# Patient Record
Sex: FEMALE | Race: WHITE | NOT HISPANIC OR LATINO | Employment: PART TIME | URBAN - METROPOLITAN AREA
[De-identification: names, ages, dates, MRNs, and addresses within clinical notes are randomized per-mention and may not be internally consistent; named-entity substitution may affect disease eponyms.]

---

## 2023-09-29 PROBLEM — N39.41 URGE URINARY INCONTINENCE: Status: ACTIVE | Noted: 2023-09-29

## 2023-09-29 PROBLEM — R79.89 ABNORMAL COMPLETE BLOOD COUNT: Status: ACTIVE | Noted: 2023-09-29

## 2023-09-29 PROBLEM — K63.5 COLON POLYP: Status: ACTIVE | Noted: 2023-09-29

## 2023-09-29 PROBLEM — B34.9 NONSPECIFIC SYNDROME SUGGESTIVE OF VIRAL ILLNESS: Status: ACTIVE | Noted: 2023-09-29

## 2023-09-29 RX ORDER — VALACYCLOVIR HYDROCHLORIDE 500 MG/1
500 TABLET, FILM COATED ORAL EVERY 12 HOURS
COMMUNITY
Start: 2023-06-09

## 2023-09-29 RX ORDER — PROGESTERONE 200 MG/1
200 CAPSULE ORAL NIGHTLY
COMMUNITY

## 2023-09-29 ASSESSMENT — PROMIS GLOBAL HEALTH SCALE
RATE_SOCIAL_SATISFACTION: EXCELLENT
RATE_AVERAGE_PAIN: 3
RATE_AVERAGE_FATIGUE: MILD
RATE_PHYSICAL_HEALTH: VERY GOOD
CARRYOUT_PHYSICAL_ACTIVITIES: COMPLETELY
EMOTIONAL_PROBLEMS: NEVER
RATE_MENTAL_HEALTH: VERY GOOD
RATE_GENERAL_HEALTH: VERY GOOD
RATE_QUALITY_OF_LIFE: EXCELLENT
CARRYOUT_SOCIAL_ACTIVITIES: EXCELLENT

## 2023-10-03 ENCOUNTER — OFFICE VISIT (OUTPATIENT)
Dept: PRIMARY CARE | Facility: CLINIC | Age: 58
End: 2023-10-03
Payer: COMMERCIAL

## 2023-10-03 VITALS
SYSTOLIC BLOOD PRESSURE: 108 MMHG | OXYGEN SATURATION: 98 % | HEART RATE: 74 BPM | TEMPERATURE: 97.2 F | WEIGHT: 112.1 LBS | DIASTOLIC BLOOD PRESSURE: 84 MMHG | BODY MASS INDEX: 19.24 KG/M2

## 2023-10-03 DIAGNOSIS — Z00.00 ANNUAL PHYSICAL EXAM: Primary | ICD-10-CM

## 2023-10-03 DIAGNOSIS — R79.89 ABNORMAL THYROID BLOOD TEST: ICD-10-CM

## 2023-10-03 DIAGNOSIS — Z23 ENCOUNTER FOR IMMUNIZATION: ICD-10-CM

## 2023-10-03 DIAGNOSIS — Z12.31 ENCOUNTER FOR SCREENING MAMMOGRAM FOR BREAST CANCER: ICD-10-CM

## 2023-10-03 DIAGNOSIS — Z78.9 VEGAN DIET: ICD-10-CM

## 2023-10-03 PROCEDURE — 90471 IMMUNIZATION ADMIN: CPT | Performed by: INTERNAL MEDICINE

## 2023-10-03 PROCEDURE — 1036F TOBACCO NON-USER: CPT | Performed by: INTERNAL MEDICINE

## 2023-10-03 PROCEDURE — 99396 PREV VISIT EST AGE 40-64: CPT | Performed by: INTERNAL MEDICINE

## 2023-10-03 PROCEDURE — 90686 IIV4 VACC NO PRSV 0.5 ML IM: CPT | Performed by: INTERNAL MEDICINE

## 2023-10-03 ASSESSMENT — PATIENT HEALTH QUESTIONNAIRE - PHQ9
2. FEELING DOWN, DEPRESSED OR HOPELESS: NOT AT ALL
1. LITTLE INTEREST OR PLEASURE IN DOING THINGS: NOT AT ALL
SUM OF ALL RESPONSES TO PHQ9 QUESTIONS 1 AND 2: 0

## 2023-10-03 NOTE — PROGRESS NOTES
Subjective   Patient ID: Linda Webber is a 57 y.o. female who presents for Annual Exam.  HPI    Patient is here for annual exam  Does not have any new concerns today  Last exam was 1 year ago sees ophthalmologist regularly.    Consumes healthy diet    does regular exercise  pregnancy [id number]  No bladder symptoms  Cervical cancer screen current normal   No history of abnormal Pap  Mammogram due   Colonoscopy       Review of Systems  General: No significant change in weight, no fatigue, no fever, chills, or night sweats.  HEENT: No headache, dizziness, syncope. No blurred vision, double vision. No hearing loss, or ringing. No nasal discharge, sinus problems. No loose teeth, sore throat, hoarseness or neck stiffness.   Breast: No pain or discharge. No mass felt.   Respiratory: No cough, mucous in throat, hemoptysis, wheezing, or shortness of breath. No snoring.  Cardiac: No chest pain, palpitations, orthopnea. No leg swelling or claudication pain.   Gastrointestinal: No indigestion, heartburn, nausea, vomiting, diarrhea, constipation, rectal bleeding or hemorrhoids.  Genitourinary: No UTI symptoms, stones, incontinence.  OBGYN: No abnormal bleeding, No pelvic pain or bloating.  Endocrine: No excess thirst or urination.  Hematopoietic: No anemia, easy bruising or bleeding. No history of blood transfusion.  Neuro: No localized weakness, numbness or tingling. No tremor, history of seizure. No history of memory problems.  Psychological: No anxiety, depression or sleep disturbance.    HISTORY  Social History     Socioeconomic History    Marital status:      Spouse name: Not on file    Number of children: Not on file    Years of education: Not on file    Highest education level: Not on file   Occupational History    Not on file   Tobacco Use    Smoking status: Never    Smokeless tobacco: Never   Substance and Sexual Activity    Alcohol use: Yes    Drug use: Never    Sexual activity: Not on file    Other Topics Concern    Not on file   Social History Narrative    Not on file     Social Determinants of Health     Financial Resource Strain: Not on file   Food Insecurity: Not on file   Transportation Needs: Not on file   Physical Activity: Not on file   Stress: Not on file   Social Connections: Not on file   Intimate Partner Violence: Not on file   Housing Stability: Not on file     Family History   Problem Relation Name Age of Onset    Other (CVA) Mother      Colon polyps Father      Coronary artery disease Father      Cancer Father          prostate    Breast cancer Father's Sister       History reviewed. No pertinent past medical history.  Past Surgical History:   Procedure Laterality Date    OTHER SURGICAL HISTORY  11/17/2020    [place] tooth extraction       Objective   /84   Pulse 74   Temp 36.2 °C (97.2 °F)   Wt 50.8 kg (112 lb 1.6 oz)   SpO2 98%   BMI 19.24 kg/m²      Lab Results   Component Value Date    WBC 3.2 (L) 11/17/2020    HGB 13.4 11/17/2020    HCT 41.5 11/17/2020     (H) 11/17/2020     11/17/2020      Physical Exam  In general, well-appearing, not in acute distress, alert and oriented.  HEENT: Pupils PERRLA.  No pallor or icterus  Neck: No lymphadenopathy, no stiffness.  Supple.  No enlargement of thyroid.No JVD  Chest: Clear to auscultation, good air entry.  CVS: S1 and S2 regular.  No murmur, no gallop, S3 or S4.  No peripheral edema.  No carotid bruit.  Abdomen: Soft, no tenderness, no hepatosplenomegaly.  Extremities: No calf tenderness.  No peripheral edema.  No knee or ankle effusion.  No finger synovitis.  No clubbing or cyanosis.  Neuro: No focal deficits.  Psych: Mood and affect normal.  Good judgment and insight  Skin:No rash    Assessment/Plan   Problem List Items Addressed This Visit       Annual physical exam - Primary     Patient examined counseling completed.  Routine labs ordered.  Additional labs since she has history of abnormal thyroid test and  follows a vegan diet  She sees gynecologist regularly  Up-to-date with colonoscopy and gets testing every 5 years due to family history  Hip pain is better after injection  Screening mammogram ordered.  Flu shot given  Get Shingrix vaccine and Tdap         Relevant Orders    CBC and Auto Differential    Comprehensive Metabolic Panel    Lipid Panel    TSH with reflex to Free T4 if abnormal    Thyroid Peroxidase (TPO) Antibody    Vitamin B12    Vitamin D 25-Hydroxy,Total (for eval of Vitamin D levels)    Vegan diet     Other Visit Diagnoses       Encounter for screening mammogram for breast cancer        Relevant Orders    Vitamin B12    Vitamin D 25-Hydroxy,Total (for eval of Vitamin D levels)    BI mammo bilateral screening tomosynthesis    Abnormal thyroid blood test        Relevant Orders    TSH with reflex to Free T4 if abnormal    Thyroid Peroxidase (TPO) Antibody    Encounter for immunization        Relevant Orders    Flu vaccine (IIV4) age 6 months and greater, preservative free (Completed)

## 2023-10-04 NOTE — ASSESSMENT & PLAN NOTE
Patient examined counseling completed.  Routine labs ordered.  Additional labs since she has history of abnormal thyroid test and follows a vegan diet  She sees gynecologist regularly  Up-to-date with colonoscopy and gets testing every 5 years due to family history  Hip pain is better after injection  Screening mammogram ordered.  Flu shot given  Get Shingrix vaccine and Tdap

## 2023-10-06 LAB
NON-UH HIE A/G RATIO: 2
NON-UH HIE ALB: 4.7 G/DL (ref 3.4–5)
NON-UH HIE ALK PHOS: 57 UNIT/L (ref 46–116)
NON-UH HIE BASO COUNT: 0.03 X1000 (ref 0–0.2)
NON-UH HIE BASOS %: 0.7 %
NON-UH HIE BILIRUBIN, TOTAL: 0.8 MG/DL (ref 0.2–1)
NON-UH HIE BUN/CREAT RATIO: 10
NON-UH HIE BUN: 9 MG/DL (ref 9–23)
NON-UH HIE CALCIUM: 9.6 MG/DL (ref 8.7–10.4)
NON-UH HIE CALCULATED LDL CHOLESTEROL: 177 MG/DL (ref 60–130)
NON-UH HIE CALCULATED OSMOLALITY: 271 MOSM/KG (ref 275–295)
NON-UH HIE CHLORIDE: 104 MMOL/L (ref 98–107)
NON-UH HIE CHOLESTEROL: 256 MG/DL (ref 100–200)
NON-UH HIE CO2, VENOUS: 27 MMOL/L (ref 20–31)
NON-UH HIE CREATININE: 0.9 MG/DL (ref 0.5–0.8)
NON-UH HIE DIFF?: NO
NON-UH HIE EOS COUNT: 0 X1000 (ref 0–0.5)
NON-UH HIE EOSIN %: 0 %
NON-UH HIE GFR AA: >60
NON-UH HIE GLOBULIN: 2.4 G/DL
NON-UH HIE GLOMERULAR FILTRATION RATE: >60 ML/MIN/1.73M?
NON-UH HIE GLUCOSE: 79 MG/DL (ref 74–106)
NON-UH HIE GOT: 19 UNIT/L (ref 15–37)
NON-UH HIE GPT: 11 UNIT/L (ref 10–49)
NON-UH HIE HCT: 42.6 % (ref 36–46)
NON-UH HIE HDL CHOLESTEROL: 50 MG/DL (ref 40–60)
NON-UH HIE HGB: 14.2 G/DL (ref 12–16)
NON-UH HIE INSTR WBC: 4
NON-UH HIE K: 3.8 MMOL/L (ref 3.5–5.1)
NON-UH HIE LYMPH %: 28 %
NON-UH HIE LYMPH COUNT: 1.11 X1000 (ref 1.2–4.8)
NON-UH HIE MCH: 33 PG (ref 27–34)
NON-UH HIE MCHC: 33.3 G/DL (ref 32–37)
NON-UH HIE MCV: 99 FL (ref 80–100)
NON-UH HIE MONO %: 8 %
NON-UH HIE MONO COUNT: 0.32 X1000 (ref 0.1–1)
NON-UH HIE MPV: 9.5 FL (ref 7.4–10.4)
NON-UH HIE NA: 137 MMOL/L (ref 135–145)
NON-UH HIE NEUTROPHIL %: 63.4 %
NON-UH HIE NEUTROPHIL COUNT (ANC): 2.51 X1000 (ref 1.4–8.8)
NON-UH HIE NUCLEATED RBC: 0 /100WBC
NON-UH HIE PLATELET: 238 X10 (ref 150–450)
NON-UH HIE RBC: 4.31 X10 (ref 4.2–5.4)
NON-UH HIE RDW: 13.1 % (ref 11.5–14.5)
NON-UH HIE TOTAL CHOL/HDL CHOL RATIO: 5.1
NON-UH HIE TOTAL PROTEIN: 7.1 G/DL (ref 5.7–8.2)
NON-UH HIE TRIGLYCERIDES: 145 MG/DL (ref 30–150)
NON-UH HIE TSH: 1.87 UIU/ML (ref 0.55–4.78)
NON-UH HIE VIT D 25: 44 NG/ML
NON-UH HIE VITAMIN B12: 1892 PG/ML (ref 211–911)
NON-UH HIE WBC: 4 X10 (ref 4.5–11)

## 2023-10-09 ENCOUNTER — TELEPHONE (OUTPATIENT)
Dept: PRIMARY CARE | Facility: CLINIC | Age: 58
End: 2023-10-09
Payer: COMMERCIAL

## 2023-10-09 LAB — NON-UH HIE THYROID (TPO) AB: 197.5 IU/ML (ref 0–9)

## 2023-10-09 NOTE — TELEPHONE ENCOUNTER
Result Communication  discussed with  patient.  B12 is high.  Decrease dose to 1000 mcg every other day    LDL high.  She will work on diet changes and will repeat labs in 3 to 4 months.  Thyroid antibodies coming down  Thyroid test is normal  Increase salt intake.  Other labs are good    Resulted Orders   [facility] HEMO   Result Value Ref Range    [facility] HCT 42.6 36.0 - 46.0 %    [facility] RBC 4.31 4.20 - 5.40 x10      Comment:      Note: RBC morphology is normal unless otherwise stated.  Evaluation performed only if differential is requested.    [facility] Platelet 238 150 - 450 x10    [facility] Nucleated RBC 0 /100WBC    [facility] Instr WBC 4.0     [facility] MCHC 33.3 32.0 - 37.0 g/dL    [facility] MCV 99.0 80.0 - 100.0 fL    [facility] HGB 14.2 12.0 - 16.0 g/dL    [facility] WBC 4.0 (L) 4.5 - 11.0 x10    [facility] RDW 13.1 11.5 - 14.5 %    [facility] MPV 9.5 7.4 - 10.4 fL    [facility] MCH 33.0 27.0 - 34.0 pg    [facility] DIFF? No    [facility] AUTO DIFF   Result Value Ref Range    [facility] Lymph Count 1.11 (L) 1.20 - 4.80 x1000    [facility] Basos % 0.7 %    [facility] Baso Count 0.03 0.00 - 0.20 x1000    [facility] Mono % 8.0 %    [facility] Mono Count 0.32 0.10 - 1.00 x1000    [facility] Neutrophil Count (ANC) 2.51 1.40 - 8.80 x1000    [facility] Neutrophil % 63.4 %    [facility] Eos Count 0.00 0.00 - 0.50 x1000    [facility] Eosin % 0.0 %    [facility] Lymph % 28.0 %   [facility] COMPMETA   Result Value Ref Range    [facility] BUN 9 9 - 23 mg/dL      Comment:      -  Venipuncture should occur prior to N-Acetyl Cysteine (NAC) or Metamizole (Sulpyrine) administration due to the potential for falsely depressed results.  -  Blood samples from some patients with monoclonal gammopathies may produce falsely elevated results      [facility] Total Protein 7.1 5.7 - 8.2 g/dL      Comment:      Total Protein results may be increased in patients receiving dextran as a blood volume expander    [facility] ALB  4.7 3.4 - 5.0 g/dL    [facility] CO2, venous 27.0 20.0 - 31.0 mmol/L    [facility] Glomerular Filtration Rate >60 mL/min/1.73m?      Comment:      Non- GFR CalcMedical judgement is necessary to interpret GFR.  The calculated GFR may not accurately reflect renal status in patients >70 years, pregnant women, acutely ill hospitalized patients and patients with acute renal failure or known renal disease.  The MDRD GFR formula is valid only for adults greater than 18 years of age.  Note:  Creatinine clearance (not GFR) should be used for drug dosing.      [facility] Chloride 104 98 - 107 mmol/L    [facility] Calculated Osmolality 271 (L) 275 - 295 mOsm/kg    [facility] Na 137 135 - 145 mmol/L    [facility] Globulin 2.4 g/dL    [facility] Calcium 9.6 8.7 - 10.4 mg/dL    [facility] GOT 19 15 - 37 unit/L    [facility] Alk Phos 57 46 - 116 unit/L    [facility] Glucose 79 74 - 106 mg/dL    [facility] GFR AA >60       Comment:       GFR CalcMedical judgement is necessary to interpret GFR.  The calculated GFR may not accurately reflect renal status in patients >70 years, pregnant women, acutely ill hospitalized patients and patients with acute renal failure or known renal disease.  The MDRD GFR formula is valid only for adults greater than 18 years of age.  Note:  Creatinine clearance (not GFR) should be used for drug dosing.      [facility] Bilirubin, Total 0.80 0.20 - 1.00 mg/dL      Comment:      Use of this assay is not recommended for patients undergoing treatment with eltrombopag due to the potential for falsely elevated results.    [facility] A/G Ratio 2.0     [facility] K 3.8 3.5 - 5.1 mmol/L    [facility] BUN/Creat Ratio 10.0     [facility] GPT 11 10 - 49 unit/L    [facility] Creatinine 0.9 (H) 0.5 - 0.8 mg/dL   [facility] LIPID PNL   Result Value Ref Range    [facility] Total Chol/HDL Chol Ratio 5.1     [facility] Triglycerides 145 30 - 150 mg/dL      Comment:      -  Venipuncture  should occur prior to N-Acetyl Cysteine (NAC) or Metamizole (Sulpyrine) administration due to the potential for falsely depressed results  -  Use of this assay is not recommended for patients being treated with etamsylate because it causes falsely decreased results      [facility] Cholesterol 256 (H) 100 - 200 mg/dL      Comment:      Venipuncture should occur prior to N-Acetyl Cysteine (NAC) or Metamizole (Sulpyrine) administration due to the potential for falsely depressed results.    [facility] Calculated LDL Cholesterol 177 (H) 60 - 130 mg/dL      Comment:      <100 mg/dl Optimal  100-129 mg/dl Near Optimal  130-159 mg/dl Borderline High  160-189 mg/dl High  >=190 mg/dl Very High      [facility] HDL Cholesterol 50 40 - 60 mg/dL      Comment:      Direct HDL Venipuncture should occur prior to metamizole (sulpyrine) administration due to the potential for falsely depressed results   [facility] VIT D 25 LEVEL   Result Value Ref Range    [facility] Vit D 25 44 ng/mL      Comment:      Less than 20 ng/mL  Deficient  20 ? 30 ng/mL   Insufficient  30 ? 100 ng/mL   Sufficiency  Greater than 100 ng/mL Potential Toxicity     [facility] TSH with FT4 Reflex   Result Value Ref Range    [facility] TSH 1.87 0.55 - 4.78 uIU/ml      Comment:      -  Do not use samples that contain fluorescein. Fluorescein levels > 0.24 ?g/mL may decrease results in this assay  -  Patients undergoing retinal fluorescein angiography can retain amounts of fluorescein in the body for up to 48?72 hours post-treatment. Such samples can produce falsely depressed values when tested with this assay, and should not be tested    Pregnancy Reference Intervals (if applicable):  First trimester:  0.6-3.4 uIU/mL  Second trimester:  0.37-3.6 uIU/mL  Third trimester:  0.38-4.04 uIU/mL  Reference: [email or web address] (10/2023)     [facility] VIT B12 LEVEL   Result Value Ref Range    [facility] Vitamin B12 1,892 (H) 211 - 911 pg/mL       11:19 AM      Results were  successfully communicated with the patient and they acknowledged their understanding.

## 2023-10-09 NOTE — TELEPHONE ENCOUNTER
Result Communication  Thyroid antibody coming down    Resulted Orders   [facility] TPO   Result Value Ref Range    [facility] Thyroid (TPO) Ab 197.5 (H) 0.0 - 9.0 IU/mL      Comment:      Performed By: [facility]  [address] [place] [id number]  : Richard Lopez MD, PhD  CLIA Number: [id number]         11:20 AM      Results were successfully communicated with the patient and they acknowledged their understanding.

## 2024-06-11 DIAGNOSIS — Z13.820 SCREENING FOR OSTEOPOROSIS: Primary | ICD-10-CM

## 2024-06-13 ENCOUNTER — HOSPITAL ENCOUNTER (OUTPATIENT)
Dept: RADIOLOGY | Facility: CLINIC | Age: 59
Discharge: HOME | End: 2024-06-13
Payer: COMMERCIAL

## 2024-06-13 DIAGNOSIS — Z13.820 SCREENING FOR OSTEOPOROSIS: ICD-10-CM

## 2024-06-13 PROCEDURE — 77080 DXA BONE DENSITY AXIAL: CPT

## 2024-06-13 PROCEDURE — 77080 DXA BONE DENSITY AXIAL: CPT | Performed by: RADIOLOGY

## 2024-06-14 NOTE — RESULT ENCOUNTER NOTE
Bone density test shows osteopenia or low bone mass.  No osteoporosis yet.  Since you already had a stress  fracture can consider preventive treatment.  Please make an appointment if you are interested to start medication

## 2024-10-01 ASSESSMENT — PROMIS GLOBAL HEALTH SCALE
RATE_SOCIAL_SATISFACTION: EXCELLENT
EMOTIONAL_PROBLEMS: NEVER
RATE_MENTAL_HEALTH: VERY GOOD
RATE_GENERAL_HEALTH: VERY GOOD
RATE_PHYSICAL_HEALTH: VERY GOOD
CARRYOUT_SOCIAL_ACTIVITIES: EXCELLENT
CARRYOUT_PHYSICAL_ACTIVITIES: COMPLETELY
RATE_AVERAGE_PAIN: 2
RATE_QUALITY_OF_LIFE: EXCELLENT
RATE_AVERAGE_FATIGUE: MODERATE

## 2024-10-02 ENCOUNTER — APPOINTMENT (OUTPATIENT)
Dept: PRIMARY CARE | Facility: CLINIC | Age: 59
End: 2024-10-02
Payer: COMMERCIAL

## 2024-10-02 VITALS
HEIGHT: 64 IN | SYSTOLIC BLOOD PRESSURE: 115 MMHG | OXYGEN SATURATION: 98 % | BODY MASS INDEX: 19.75 KG/M2 | DIASTOLIC BLOOD PRESSURE: 77 MMHG | HEART RATE: 81 BPM | WEIGHT: 115.7 LBS

## 2024-10-02 DIAGNOSIS — Z00.00 ANNUAL PHYSICAL EXAM: Primary | ICD-10-CM

## 2024-10-02 DIAGNOSIS — Z12.31 ENCOUNTER FOR SCREENING MAMMOGRAM FOR BREAST CANCER: ICD-10-CM

## 2024-10-02 PROCEDURE — 90656 IIV3 VACC NO PRSV 0.5 ML IM: CPT | Performed by: INTERNAL MEDICINE

## 2024-10-02 PROCEDURE — 90472 IMMUNIZATION ADMIN EACH ADD: CPT | Performed by: INTERNAL MEDICINE

## 2024-10-02 PROCEDURE — 90471 IMMUNIZATION ADMIN: CPT | Performed by: INTERNAL MEDICINE

## 2024-10-02 PROCEDURE — 99396 PREV VISIT EST AGE 40-64: CPT | Performed by: INTERNAL MEDICINE

## 2024-10-02 PROCEDURE — 90715 TDAP VACCINE 7 YRS/> IM: CPT | Performed by: INTERNAL MEDICINE

## 2024-10-02 PROCEDURE — 3008F BODY MASS INDEX DOCD: CPT | Performed by: INTERNAL MEDICINE

## 2024-10-02 PROCEDURE — 1036F TOBACCO NON-USER: CPT | Performed by: INTERNAL MEDICINE

## 2024-10-02 NOTE — PROGRESS NOTES
"Subjective   Patient ID: Linda Webber \"Marquis" is a 58 y.o. female who presents for Annual Exam (Flu shot.).  HPI    Patient is here for annual exam  Does not have any new concerns today  Sees dentist regularly.    Consumes healthy diet/vegan    Does regular exercise  pregnancy [id number]  No bladder symptoms  Cervical cancer screen current normal   No history of abnormal Pap  Mammogram due   Colonoscopy   Medical conditions:high cholesterol and osteopenia  Vaccines:shingrix due.tdap and flu due    Review of Systems  General: No significant change in weight, no fatigue, no fever, chills, or night sweats.  HEENT: No headache, dizziness, syncope. No blurred vision, double vision. No hearing loss, or ringing. No nasal discharge, sinus problems. No loose teeth, sore throat, hoarseness or neck stiffness.   Breast: No pain or discharge. No mass felt.   Respiratory: No cough, mucous in throat, hemoptysis, wheezing, or shortness of breath. No snoring.  Cardiac: No chest pain, palpitations, orthopnea. No leg swelling or claudication pain.   Gastrointestinal: No indigestion, heartburn, nausea, vomiting, diarrhea, constipation, rectal bleeding or hemorrhoids.  Genitourinary: No UTI symptoms, stones, incontinence.  OBGYN: No abnormal bleeding, No pelvic pain or bloating.  Endocrine: No excess thirst or urination.  Hematopoietic: No anemia, easy bruising or bleeding. No history of blood transfusion.  Neuro: No localized weakness, numbness or tingling. No tremor, history of seizure. No history of memory problems.  Psychological: No anxiety, depression or sleep disturbance.    HISTORY  Social History     Socioeconomic History    Marital status:      Spouse name: Not on file    Number of children: Not on file    Years of education: Not on file    Highest education level: Not on file   Occupational History    Not on file   Tobacco Use    Smoking status: Never    Smokeless tobacco: Never   Substance and Sexual " "Activity    Alcohol use: Never    Drug use: Never    Sexual activity: Not on file   Other Topics Concern    Not on file   Social History Narrative    Not on file     Social Determinants of Health     Financial Resource Strain: Not on file   Food Insecurity: Not on file   Transportation Needs: Not on file   Physical Activity: Not on file   Stress: Not on file   Social Connections: Not on file   Intimate Partner Violence: Not on file   Housing Stability: Not on file     Family History   Problem Relation Name Age of Onset    Other (CVA) Mother      Colon polyps Father      Coronary artery disease Father      Cancer Father          prostate    Breast cancer Father's Sister       History reviewed. No pertinent past medical history.  Past Surgical History:   Procedure Laterality Date    OTHER SURGICAL HISTORY  11/17/2020    [place] tooth extraction     @[facility]  Objective   /77   Pulse 81   Ht 1.626 m (5' 4\")   Wt 52.5 kg (115 lb 11.2 oz)   SpO2 98%   BMI 19.86 kg/m²      Lab Results   Component Value Date    WBC 3.2 (L) 11/17/2020    HGB 13.4 11/17/2020    HCT 41.5 11/17/2020     (H) 11/17/2020     11/17/2020   PAP@MAMMOGRAM  Physical Exam  In general, well-appearing, not in acute distress, alert and oriented.  HEENT: Pupils PERRLA.  No pallor or icterus  Neck: No lymphadenopathy, no stiffness.  Supple.  No enlargement of thyroid.No JVD  Chest: Clear to auscultation, good air entry.  CVS: S1 and S2 regular.  No murmur, no gallop, S3 or S4.  No peripheral edema.  No carotid bruit.  Abdomen: Soft, no tenderness, no hepatosplenomegaly.  Extremities: No calf tenderness.  No peripheral edema.  No knee or ankle effusion.  No finger synovitis.  No clubbing or cyanosis.  Neuro: No focal deficits.  Psych: Mood and affect normal.  Good judgment and insight  Skin:No rash    Assessment/Plan   Problem List Items Addressed This Visit       Annual physical exam - Primary    Relevant Orders    Comprehensive " Metabolic Panel    CBC and Auto Differential    Lipid Panel    Thyroid Peroxidase (TPO) Antibody    TSH with reflex to Free T4 if abnormal     Other Visit Diagnoses       Encounter for screening mammogram for breast cancer        Relevant Orders    BI mammo bilateral screening tomosynthesis            Patient examined counseling completed.  Routine labs ordered.  Additional labs since she has history of abnormal thyroid test and follows a vegan diet.  Sees gynecologist regularly  Up-to-date with colonoscopy and gets testing every 5 years due to family history  Screening mammogram ordered.  Flu shot given  Get Shingrix vaccine   consider CT scoring if LDL is high

## 2024-10-03 DIAGNOSIS — E78.2 MIXED HYPERLIPIDEMIA: Primary | ICD-10-CM

## 2024-10-03 LAB
NON-UH HIE A/G RATIO: 1.4
NON-UH HIE ALB: 4 G/DL (ref 3.4–5)
NON-UH HIE ALK PHOS: 70 UNIT/L (ref 45–117)
NON-UH HIE BASO COUNT: 0.04 X1000 (ref 0–0.2)
NON-UH HIE BASOS %: 0.7 %
NON-UH HIE BILIRUBIN, TOTAL: 0.7 MG/DL (ref 0.3–1.2)
NON-UH HIE BUN/CREAT RATIO: 11.1
NON-UH HIE BUN: 10 MG/DL (ref 9–23)
NON-UH HIE CALCIUM: 9.8 MG/DL (ref 8.7–10.4)
NON-UH HIE CALCULATED LDL CHOLESTEROL: 156 MG/DL (ref 60–130)
NON-UH HIE CALCULATED OSMOLALITY: 276 MOSM/KG (ref 275–295)
NON-UH HIE CHLORIDE: 105 MMOL/L (ref 98–107)
NON-UH HIE CHOLESTEROL: 247 MG/DL (ref 100–200)
NON-UH HIE CO2, VENOUS: 26 MMOL/L (ref 20–31)
NON-UH HIE CREATININE: 0.9 MG/DL (ref 0.5–0.8)
NON-UH HIE DIFF?: NO
NON-UH HIE EOS COUNT: 0 X1000 (ref 0–0.5)
NON-UH HIE EOSIN %: 0 %
NON-UH HIE GFR AA: >60
NON-UH HIE GLOBULIN: 2.8 G/DL
NON-UH HIE GLOMERULAR FILTRATION RATE: >60 ML/MIN/1.73M?
NON-UH HIE GLUCOSE: 90 MG/DL (ref 74–106)
NON-UH HIE GOT: 19 UNIT/L (ref 15–37)
NON-UH HIE GPT: 13 UNIT/L (ref 10–49)
NON-UH HIE HCT: 40.4 % (ref 36–46)
NON-UH HIE HDL CHOLESTEROL: 52 MG/DL (ref 40–60)
NON-UH HIE HGB: 13.4 G/DL (ref 12–16)
NON-UH HIE INSTR WBC: 5.7
NON-UH HIE K: 4.4 MMOL/L (ref 3.5–5.1)
NON-UH HIE LYMPH %: 19.5 %
NON-UH HIE LYMPH COUNT: 1.1 X1000 (ref 1.2–4.8)
NON-UH HIE MCH: 32 PG (ref 27–34)
NON-UH HIE MCHC: 33.1 G/DL (ref 32–37)
NON-UH HIE MCV: 96.5 FL (ref 80–100)
NON-UH HIE MONO %: 6.4 %
NON-UH HIE MONO COUNT: 0.36 X1000 (ref 0.1–1)
NON-UH HIE MPV: 9 FL (ref 7.4–10.4)
NON-UH HIE NA: 139 MMOL/L (ref 135–145)
NON-UH HIE NEUTROPHIL %: 73.4 %
NON-UH HIE NEUTROPHIL COUNT (ANC): 4.15 X1000 (ref 1.4–8.8)
NON-UH HIE NUCLEATED RBC: 0 /100WBC
NON-UH HIE PLATELET: 264 X10 (ref 150–450)
NON-UH HIE RBC: 4.19 X10 (ref 4.2–5.4)
NON-UH HIE RDW: 13.2 % (ref 11.5–14.5)
NON-UH HIE TOTAL CHOL/HDL CHOL RATIO: 4.8
NON-UH HIE TOTAL PROTEIN: 6.8 G/DL (ref 5.7–8.2)
NON-UH HIE TRIGLYCERIDES: 194 MG/DL (ref 30–150)
NON-UH HIE TSH: 2.38 UIU/ML (ref 0.55–4.78)
NON-UH HIE WBC: 5.7 X10 (ref 4.5–11)

## 2024-10-03 NOTE — RESULT ENCOUNTER NOTE
Reviewed labs.  Labs look okay.  LDL and cholesterol slightly lower than last time.  I ordered the CT scoring as discussed at visit

## 2024-10-04 LAB — NON-UH HIE THYROID (TPO) AB: 680.2 IU/ML (ref 0–9)

## 2024-12-20 ENCOUNTER — HOSPITAL ENCOUNTER (OUTPATIENT)
Dept: RADIOLOGY | Facility: CLINIC | Age: 59
Discharge: HOME | End: 2024-12-20
Payer: COMMERCIAL

## 2024-12-20 DIAGNOSIS — E78.2 MIXED HYPERLIPIDEMIA: ICD-10-CM

## 2024-12-20 PROCEDURE — 75571 CT HRT W/O DYE W/CA TEST: CPT

## 2025-06-03 ENCOUNTER — APPOINTMENT (OUTPATIENT)
Dept: PRIMARY CARE | Facility: CLINIC | Age: 60
End: 2025-06-03
Payer: COMMERCIAL

## 2025-06-03 VITALS
WEIGHT: 115.2 LBS | BODY MASS INDEX: 19.67 KG/M2 | SYSTOLIC BLOOD PRESSURE: 124 MMHG | HEART RATE: 77 BPM | OXYGEN SATURATION: 96 % | HEIGHT: 64 IN | DIASTOLIC BLOOD PRESSURE: 81 MMHG

## 2025-06-03 DIAGNOSIS — H00.014 HORDEOLUM EXTERNUM OF LEFT UPPER EYELID: ICD-10-CM

## 2025-06-03 DIAGNOSIS — E06.3 HASHIMOTO'S THYROIDITIS: ICD-10-CM

## 2025-06-03 DIAGNOSIS — R53.81 MALAISE: Primary | ICD-10-CM

## 2025-06-03 DIAGNOSIS — Z78.9 VEGAN DIET: ICD-10-CM

## 2025-06-03 DIAGNOSIS — L65.9 HAIR LOSS: ICD-10-CM

## 2025-06-03 PROCEDURE — 99213 OFFICE O/P EST LOW 20 MIN: CPT | Performed by: INTERNAL MEDICINE

## 2025-06-03 PROCEDURE — 1036F TOBACCO NON-USER: CPT | Performed by: INTERNAL MEDICINE

## 2025-06-03 PROCEDURE — 3008F BODY MASS INDEX DOCD: CPT | Performed by: INTERNAL MEDICINE

## 2025-06-03 RX ORDER — ERYTHROMYCIN 5 MG/G
OINTMENT OPHTHALMIC 4 TIMES DAILY
Qty: 3.5 G | Refills: 0 | Status: SHIPPED | OUTPATIENT
Start: 2025-06-03 | End: 2025-06-10

## 2025-06-03 ASSESSMENT — PATIENT HEALTH QUESTIONNAIRE - PHQ9
1. LITTLE INTEREST OR PLEASURE IN DOING THINGS: NOT AT ALL
SUM OF ALL RESPONSES TO PHQ9 QUESTIONS 1 AND 2: 0
2. FEELING DOWN, DEPRESSED OR HOPELESS: NOT AT ALL

## 2025-06-03 NOTE — PROGRESS NOTES
"Subjective   Patient ID: Denise Webber is a 59 y.o. female who presents for Fatigue (Pt states she has been fatigued lately. She feels a little better since she made the appt. But would like to discuss.).    HPI   On HRT.Has pellets implanted and is on progesterone  Was feeling very tired since April  Was in [place] from Jan to [place] first  Was not sick    Has Hashimoto's.  No weight loss or night sweats  No cough chest pain or recent illness  No GI symptoms  Up-to-date with GYN care and mammogram and colonoscopy  Has a stye left upper lid.  Using warm compresses but not resolving.  No significant pain.  Has contact lens        Review of Systems  Constitutional: No recent weight loss, no fever or chills or night sweats.    HEENT: No blurred vision or double vision.  No hearing loss.  No sinus drainage or nosebleeds  CVS: No exertional chest pain or shortness of breath.  No palpitation or edema  Respiratory: No chronic cough or shortness of breath or wheezing  GI: No nausea vomiting or heartburn diarrhea or constipation.  No rectal bleed or bowel changes  Genitourinary: No urinary frequency or hesitancy.  No nocturia or blood in urine  Neuro: No weakness numbness or tingling.  No memory issues.  Psych: No anxiety or depression   Objective   /81   Pulse 77   Ht 1.626 m (5' 4\")   Wt 52.3 kg (115 lb 3.2 oz)   SpO2 96%   BMI 19.77 kg/m²     Physical Exam  In general, well-appearing, not in acute distress, alert and oriented.  HEENT: No pallor or icterus.  Stye left upper eyelid.  Conjunctiva normal  Neck: No lymphadenopathy, no stiffness.  Supple.  .No JVD  Chest: Clear to auscultation, good air entry.  CVS: S1 and S2 regular.  No murmur, no gallop, S3 or S4.  No peripheral edema.  No carotid bruit.  Abdomen: Soft, no tenderness, no hepatosplenomegaly.  Extremities: No calf tenderness.  No peripheral edema.   Neuro: No focal deficits.  Psych: Mood and affect normal.  Good judgment and insight "   Assessment/Plan   Problem List Items Addressed This Visit           ICD-10-CM    Vegan diet Z78.9    Relevant Orders    Magnesium    Vitamin B12    Vitamin D 25-Hydroxy,Total (for eval of Vitamin D levels)    Zinc    Ferritin    Malaise - Primary R53.81    Relevant Orders    Magnesium    CBC and Auto Differential    Comprehensive Metabolic Panel    Vitamin B12    High sensitivity CRP    Sedimentation Rate    Hair loss L65.9    Relevant Orders    Magnesium    Vitamin D 25-Hydroxy,Total (for eval of Vitamin D levels)    Zinc    Ferritin    Hashimoto's thyroiditis E06.3    Relevant Orders    Lipid Panel    Vitamin D 25-Hydroxy,Total (for eval of Vitamin D levels)    High sensitivity CRP    Sedimentation Rate    TSH with reflex to Free T4 if abnormal    Triiodothyronine, Free    Hordeolum externum of left upper eyelid H00.014    Relevant Medications    erythromycin (Romycin) 5 mg/gram (0.5 %) ophthalmic ointment     Patient has been having unexplained fatigue.  She is a vegan.  Has history of connective tissue disease  On HRT  Will check labs including vitamin levels  Check inflammatory markers due to Hashimoto's and history of connective tissue disease     continue to apply warm compress and use erythromycin.  See ophthalmology if symptoms are persisting

## 2025-06-07 LAB
25(OH)D3+25(OH)D2 SERPL-MCNC: 50 NG/ML (ref 30–100)
ALBUMIN SERPL-MCNC: 4.1 G/DL (ref 3.6–5.1)
ALP SERPL-CCNC: 60 U/L (ref 37–153)
ALT SERPL-CCNC: 11 U/L (ref 6–29)
ANION GAP SERPL CALCULATED.4IONS-SCNC: 8 MMOL/L (CALC) (ref 7–17)
AST SERPL-CCNC: 16 U/L (ref 10–35)
BASOPHILS # BLD AUTO: 42 CELLS/UL (ref 0–200)
BASOPHILS NFR BLD AUTO: 1.1 %
BILIRUB SERPL-MCNC: 0.4 MG/DL (ref 0.2–1.2)
BUN SERPL-MCNC: 14 MG/DL (ref 7–25)
CALCIUM SERPL-MCNC: 9.2 MG/DL (ref 8.6–10.4)
CHLORIDE SERPL-SCNC: 106 MMOL/L (ref 98–110)
CHOLEST SERPL-MCNC: 221 MG/DL
CHOLEST/HDLC SERPL: 3.9 (CALC)
CO2 SERPL-SCNC: 25 MMOL/L (ref 20–32)
CREAT SERPL-MCNC: 0.88 MG/DL (ref 0.5–1.03)
CRP SERPL HS-MCNC: 0.5 MG/L
EGFRCR SERPLBLD CKD-EPI 2021: 77 ML/MIN/1.73M2
EOSINOPHIL # BLD AUTO: 0 CELLS/UL (ref 15–500)
EOSINOPHIL NFR BLD AUTO: 0 %
ERYTHROCYTE [DISTWIDTH] IN BLOOD BY AUTOMATED COUNT: 12.7 % (ref 11–15)
ERYTHROCYTE [SEDIMENTATION RATE] IN BLOOD BY WESTERGREN METHOD: 2 MM/H
FERRITIN SERPL-MCNC: 15 NG/ML (ref 16–232)
GLUCOSE SERPL-MCNC: 79 MG/DL (ref 65–99)
HCT VFR BLD AUTO: 40 % (ref 35–45)
HDLC SERPL-MCNC: 57 MG/DL
HGB BLD-MCNC: 13.1 G/DL (ref 11.7–15.5)
LDLC SERPL CALC-MCNC: 144 MG/DL (CALC)
LYMPHOCYTES # BLD AUTO: 1353 CELLS/UL (ref 850–3900)
LYMPHOCYTES NFR BLD AUTO: 35.6 %
MAGNESIUM SERPL-MCNC: 1.9 MG/DL (ref 1.5–2.5)
MCH RBC QN AUTO: 32.8 PG (ref 27–33)
MCHC RBC AUTO-ENTMCNC: 32.8 G/DL (ref 32–36)
MCV RBC AUTO: 100 FL (ref 80–100)
MONOCYTES # BLD AUTO: 384 CELLS/UL (ref 200–950)
MONOCYTES NFR BLD AUTO: 10.1 %
NEUTROPHILS # BLD AUTO: 2022 CELLS/UL (ref 1500–7800)
NEUTROPHILS NFR BLD AUTO: 53.2 %
NONHDLC SERPL-MCNC: 164 MG/DL (CALC)
PLATELET # BLD AUTO: 228 THOUSAND/UL (ref 140–400)
PMV BLD REES-ECKER: 11 FL (ref 7.5–12.5)
POTASSIUM SERPL-SCNC: 4.5 MMOL/L (ref 3.5–5.3)
PROT SERPL-MCNC: 6.2 G/DL (ref 6.1–8.1)
RBC # BLD AUTO: 4 MILLION/UL (ref 3.8–5.1)
SODIUM SERPL-SCNC: 139 MMOL/L (ref 135–146)
T3FREE SERPL-MCNC: 2.9 PG/ML (ref 2.3–4.2)
TRIGL SERPL-MCNC: 90 MG/DL
TSH SERPL-ACNC: 2.01 MIU/L (ref 0.4–4.5)
VIT B12 SERPL-MCNC: 304 PG/ML (ref 200–1100)
WBC # BLD AUTO: 3.8 THOUSAND/UL (ref 3.8–10.8)
ZINC SERPL-MCNC: 61 MCG/DL (ref 60–130)

## 2025-08-07 LAB
NON-UH HIE ESTRADIOL: 136.1 PG/ML
NON-UH HIE FSH: 68 IU/L

## 2025-08-14 LAB
NON-UH HIE SEX HORMONE BINDING GLOBULIN: 173 NMOL/L (ref 17–125)
NON-UH HIE TESTOSTERONE, FREE LC-MS/MS: 12.4 PG/ML (ref 0.6–3.8)
NON-UH HIE TESTOSTERONE, LC-MS/MS: 241 NG/DL (ref 9–55)